# Patient Record
(demographics unavailable — no encounter records)

---

## 2024-12-18 NOTE — ASSESSMENT
[FreeTextEntry1] : Presumptive right parietal meningioma which has increased in size on the most recent brain MRI She reports loss of sensation in the entire left side of the body which does not appear to be of organic origin on examination The original MRI did report some mild edema so I am suggesting a tapering course of prednisone as per the orders I am also suggesting follow-up with her neurosurgeon and of asked her to give me a call thereafter

## 2024-12-18 NOTE — PHYSICAL EXAM
[General Appearance - Alert] : alert [General Appearance - In No Acute Distress] : in no acute distress [Oriented To Time, Place, And Person] : oriented to person, place, and time [General Appearance - Well-Appearing] : healthy appearing [Impaired Insight] : insight and judgment were intact [Affect] : the affect was normal [Memory Recent] : recent memory was not impaired [Person] : oriented to person [Place] : oriented to place [Time] : oriented to time [Concentration Intact] : normal concentrating ability [Visual Intact] : visual attention was ~T not ~L decreased [Fluency] : fluency intact [Comprehension] : comprehension intact [Past History] : adequate knowledge of personal past history [Cranial Nerves Optic (II)] : visual acuity intact bilaterally,  visual fields full to confrontation, pupils equal round and reactive to light [Cranial Nerves Oculomotor (III)] : extraocular motion intact [Cranial Nerves Facial (VII)] : face symmetrical [Cranial Nerves Vestibulocochlear (VIII)] : hearing was intact bilaterally [Cranial Nerves Glossopharyngeal (IX)] : tongue and palate midline [Cranial Nerves Accessory (XI - Cranial And Spinal)] : head turning and shoulder shrug symmetric [Cranial Nerves Hypoglossal (XII)] : there was no tongue deviation with protrusion [Motor Tone] : muscle tone was normal in all four extremities [Motor Strength] : muscle strength was normal in all four extremities [No Muscle Atrophy] : normal bulk in all four extremities [Paresis Pronator Drift Right-Sided] : no pronator drift on the right [Paresis Pronator Drift Left-Sided] : no pronator drift on the left [Proprioception] : proprioception was intact [Abnormal Walk] : normal gait [Balance] : balance was intact [Past-pointing] : there was no past-pointing [Tremor] : no tremor present [Coordination - Dysmetria Impaired Finger-to-Nose Bilateral] : not present [2+] : Ankle jerk left 2+ [FreeTextEntry5] : Reports decreased pin sensation over the left side of the face. [FreeTextEntry7] : Reports total loss of pin sensation the entire left arm and left leg which did not seem physiologic [PERRL With Normal Accommodation] : pupils were equal in size, round, reactive to light, with normal accommodation [Extraocular Movements] : extraocular movements were intact [Full Visual Field] : full visual field

## 2024-12-18 NOTE — HISTORY OF PRESENT ILLNESS
[FreeTextEntry1] : This 46-year-old woman was seen in neurological consultation today She initially developed headaches and dizziness September 2023 and was seen at [facility] CT scan of the head and MRI showed a presumptive right parietal meningioma Surgery was not felt warranted at that time For the last couple of months she reports numbness of the entire left side of her body It is affecting her ability to function as she is left-handed.  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

## 2025-02-06 NOTE — HISTORY OF PRESENT ILLNESS
[FreeTextEntry1] : Ms. Ricky Avalos is a pleasant 46 year old female with a known right parietal meningioma who presents for follow up of left sided body numbness.  She was last seen at the beginning of September with subsequent multiple cancels appointments and has not followed up since.  She reports that her main issues at this time are pressure on the right side of her head as well as numbness of the entire left side of her body.  She saw Dr. Rodríguez of neurology without an identifiable etiology.

## 2025-02-06 NOTE — REASON FOR VISIT
[Home] : at home, [unfilled] , at the time of the visit. [Medical Office: ([facility])___] : at the medical office located in  [Verbal consent obtained from patient] : the patient, [unfilled] [Follow-Up: _____] : a [unfilled] follow-up visit

## 2025-02-06 NOTE — ASSESSMENT
[FreeTextEntry1] : Ms. Ricky Avalos is a pleasant 46 year old female with a known right parietal meningioma who presents for follow up of left sided body numbness.  I reviewed her MRI imaging with her which demonstrates a very slight increase in the size of her meningioma but is overall stable.  I discussed with her that I do not think the meningioma is the cause of her left sided body numbness and I unfortunately do not know the cause of her left body numbness.  She is also previously called my office asking for forms or letters for her inability to work which my office discussed with her we could not provide.  I advised her that I could provide her other neurologist or neurosurgeons for a second opinion. All questions answered.  She knows to call my office with any issues or concerns.